# Patient Record
Sex: FEMALE | HISPANIC OR LATINO | URBAN - METROPOLITAN AREA
[De-identification: names, ages, dates, MRNs, and addresses within clinical notes are randomized per-mention and may not be internally consistent; named-entity substitution may affect disease eponyms.]

---

## 2018-08-03 ENCOUNTER — OFFICE VISIT (OUTPATIENT)
Dept: URBAN - METROPOLITAN AREA CLINIC 48 | Facility: CLINIC | Age: 73
End: 2018-08-03
Payer: COMMERCIAL

## 2018-08-03 DIAGNOSIS — H25.13 AGE-RELATED NUCLEAR CATARACT, BILATERAL: ICD-10-CM

## 2018-08-03 DIAGNOSIS — E11.9 TYPE 2 DIABETES MELLITUS W/O COMPLICATION: Primary | ICD-10-CM

## 2018-08-03 PROCEDURE — 92014 COMPRE OPH EXAM EST PT 1/>: CPT | Performed by: OPHTHALMOLOGY

## 2018-08-03 PROCEDURE — 92134 CPTRZ OPH DX IMG PST SGM RTA: CPT | Performed by: OPHTHALMOLOGY

## 2018-08-03 ASSESSMENT — INTRAOCULAR PRESSURE
OD: 16
OS: 16

## 2018-08-03 ASSESSMENT — VISUAL ACUITY: OS: 20/400

## 2018-08-03 NOTE — IMPRESSION/PLAN
Impression: Type 2 diabetes mellitus w/o complication: R25.0.  Plan: No diabetic retinopathy on today's exam

Recommend yearly diabetic exam

## 2018-08-03 NOTE — IMPRESSION/PLAN
Impression: Age-related nuclear cataract, bilateral: H25.13. Plan: 1-2 week CE with LAKISHA OS no PHOENIX Sullivan to check pupils

## 2018-08-03 NOTE — IMPRESSION/PLAN
Impression: Nonexudative age-related macular degeneration, bilateral, early dry stage: H35.3131. Plan: Macular degeneration, dry type with stable vision. Will continue to monitor vision and the patient has been instructed to call with any vision changes. MYRIAM and AREDS2 discussed with patient.  

rtc 6 month DE OCT/MAC

## 2018-08-03 NOTE — IMPRESSION/PLAN
Impression: Anatomical narrow angle, bilateral: H40.033. IOP ou borderline ONH suspicious appearance ou
average CCT's ou - at risk for AACG ou Plan: [place] 1 month

## 2018-08-21 ENCOUNTER — OFFICE VISIT (OUTPATIENT)
Dept: URBAN - METROPOLITAN AREA CLINIC 48 | Facility: CLINIC | Age: 73
End: 2018-08-21
Payer: COMMERCIAL

## 2018-08-21 PROCEDURE — 92012 INTRM OPH EXAM EST PATIENT: CPT | Performed by: OPHTHALMOLOGY

## 2018-08-21 NOTE — IMPRESSION/PLAN
Impression: Age-related nuclear cataract, bilateral: H25.13. Plan: The patient has a visually significant cataract in both eyes. After discussion with the patient and careful examination it has been determined that a cataract in both eyes is accounting for a significant amount of the patient's visual symptoms. Cataract surgery and the associated risks, benefits, alternatives, expectations, and recovery were discussed in detail with the patient. All questions were answered. The patient understands that there may be some limitation in visual potential given any pre-existing ocular disease. The patient desires to think about cataract surgery.

## 2018-09-04 ENCOUNTER — OFFICE VISIT (OUTPATIENT)
Dept: URBAN - METROPOLITAN AREA CLINIC 48 | Facility: CLINIC | Age: 73
End: 2018-09-04
Payer: COMMERCIAL

## 2018-09-04 DIAGNOSIS — H25.812 COMBINED FORMS OF AGE-RELATED CATARACT, LEFT EYE: ICD-10-CM

## 2018-09-04 DIAGNOSIS — H40.033 ANATOMICAL NARROW ANGLE, BILATERAL: ICD-10-CM

## 2018-09-04 DIAGNOSIS — H35.3131 NONEXUDATIVE AGE-RELATED MACULAR DEGENERATION, BILATERAL, EARLY DRY STAGE: ICD-10-CM

## 2018-09-04 PROCEDURE — 92014 COMPRE OPH EXAM EST PT 1/>: CPT | Performed by: OPHTHALMOLOGY

## 2018-09-04 PROCEDURE — 92020 GONIOSCOPY: CPT | Performed by: OPHTHALMOLOGY

## 2018-09-04 ASSESSMENT — INTRAOCULAR PRESSURE
OD: 20
OS: 21

## 2018-09-04 NOTE — IMPRESSION/PLAN
Impression: Nonexudative age-related macular degeneration, bilateral, early dry stage: H35.3131. Plan: Macular degeneration, dry type, appears progressed with decreased vision. Vitamins recommended.

## 2018-09-04 NOTE — IMPRESSION/PLAN
Impression: Combined forms of age-related cataract, left eye: H25.812. Plan: The patient has a visually significant cataract in left eye, after discussion with the patient and careful examination it has been determined that a cataract in left eye is accounting for a significant amount of the patient's visual symptoms. Cataract surgery and the associated risks, benefits, alternatives, expectations, and recovery were discussed in detail with the patient. All questions were answered. The patient understands that there may be some limitation in visual potential given any pre-existing ocular disease. The patient desires cataract surgery in left eye. RL 2  Patient desires standard lens for distance target. Patient is to start Combo drops Pred-Gati-Nepaf QID to operative eye one day prior to surgery, and continue after surgery.  

Schedule cataract surgery in left eye 2nd [facility] done at hospitals

## 2018-09-04 NOTE — IMPRESSION/PLAN
Impression: Age-related nuclear cataract, right eye: H25.11. Plan: The patient has a visually significant cataract in right eye, after discussion with the patient and careful examination it has been determined that a cataract in right eye is accounting for a significant amount of the patient's visual symptoms. Cataract surgery and the associated risks, benefits, alternatives, expectations, and recovery were discussed in detail with the patient. All questions were answered. The patient understands that there may be some limitation in visual potential given any pre-existing ocular disease. The patient desires cataract surgery in right eye. RL 2  Patient desires standard lens for distance target. Patient is to start Combo drops (Pred-Gati-Nepaf) QID to operative eye one day prior to surgery, and continue after surgery. 

Schedule cataract surgery in right eye 1 st to be done at the [facility]

## 2018-09-17 ENCOUNTER — PRE-OPERATIVE VISIT (OUTPATIENT)
Dept: URBAN - METROPOLITAN AREA CLINIC 48 | Facility: CLINIC | Age: 73
End: 2018-09-17
Payer: COMMERCIAL

## 2018-09-17 DIAGNOSIS — H25.11 AGE-RELATED NUCLEAR CATARACT, RIGHT EYE: Primary | ICD-10-CM

## 2018-09-17 PROCEDURE — 92136 OPHTHALMIC BIOMETRY: CPT | Performed by: OPHTHALMOLOGY

## 2018-09-17 ASSESSMENT — PACHYMETRY
OS: 23.01
OD: 22.94
OS: 2.78
OD: 2.63

## 2018-09-18 RX ORDER — KETOROLAC TROMETHAMINE 5 MG/ML
0.5 % SOLUTION OPHTHALMIC
Qty: 1 | Refills: 2 | Status: ACTIVE
Start: 2018-09-18

## 2018-09-18 RX ORDER — PREDNISOLONE ACETATE 10 MG/ML
1 % SUSPENSION/ DROPS OPHTHALMIC
Qty: 1 | Refills: 3 | Status: ACTIVE
Start: 2018-09-18

## 2018-09-18 RX ORDER — OFLOXACIN 3 MG/ML
0.3 % SOLUTION/ DROPS OPHTHALMIC
Qty: 1 | Refills: 2 | Status: ACTIVE
Start: 2018-09-18

## 2018-10-15 ENCOUNTER — SURGERY (OUTPATIENT)
Dept: URBAN - METROPOLITAN AREA EXTERNAL CLINIC 38 | Facility: EXTERNAL CLINIC | Age: 73
End: 2018-10-15
Payer: COMMERCIAL

## 2018-10-15 ENCOUNTER — POST-OPERATIVE VISIT (OUTPATIENT)
Dept: URBAN - METROPOLITAN AREA CLINIC 48 | Facility: CLINIC | Age: 73
End: 2018-10-15
Payer: COMMERCIAL

## 2018-10-15 DIAGNOSIS — Z09 ENCNTR FOR F/U EXAM AFT TRTMT FOR COND OTH THAN MALIG NEOPLM: Primary | ICD-10-CM

## 2018-10-15 PROCEDURE — 99024 POSTOP FOLLOW-UP VISIT: CPT | Performed by: OPHTHALMOLOGY

## 2018-10-15 PROCEDURE — 66984 XCAPSL CTRC RMVL W/O ECP: CPT | Performed by: OPHTHALMOLOGY

## 2018-10-15 ASSESSMENT — INTRAOCULAR PRESSURE
OD: 24
OD: 24

## 2018-10-24 ENCOUNTER — POST-OPERATIVE VISIT (OUTPATIENT)
Dept: URBAN - METROPOLITAN AREA CLINIC 48 | Facility: CLINIC | Age: 73
End: 2018-10-24
Payer: COMMERCIAL

## 2018-10-24 PROCEDURE — 99024 POSTOP FOLLOW-UP VISIT: CPT | Performed by: OPTOMETRIST

## 2018-10-24 ASSESSMENT — INTRAOCULAR PRESSURE: OD: 14

## 2018-10-24 NOTE — IMPRESSION/PLAN
Impression: Encntr for f/u exam aft trtmt for cond oth than malig neoplm: Z09. S/P Cat ext w/ IOL doing well. OK for 2nd eye surgery.  Plan: Schedule Sx OS - Pt elects